# Patient Record
Sex: MALE | Race: BLACK OR AFRICAN AMERICAN | URBAN - METROPOLITAN AREA
[De-identification: names, ages, dates, MRNs, and addresses within clinical notes are randomized per-mention and may not be internally consistent; named-entity substitution may affect disease eponyms.]

---

## 2021-10-05 ENCOUNTER — OFFICE VISIT (OUTPATIENT)
Dept: URBAN - METROPOLITAN AREA CLINIC 29 | Facility: CLINIC | Age: 36
End: 2021-10-05
Payer: COMMERCIAL

## 2021-10-05 DIAGNOSIS — H53.022 REFRACTIVE AMBLYOPIA, LEFT EYE: Primary | ICD-10-CM

## 2021-10-05 PROCEDURE — 92004 COMPRE OPH EXAM NEW PT 1/>: CPT | Performed by: OPTOMETRIST

## 2021-10-05 ASSESSMENT — INTRAOCULAR PRESSURE
OS: 13
OD: 13

## 2021-10-05 ASSESSMENT — KERATOMETRY
OS: 43.50
OD: 43.00

## 2021-10-05 NOTE — IMPRESSION/PLAN
Impression: Refractive amblyopia, left eye: H53.022. Plan: Discussed diagnosis in detail with patient. No treatment is required at this time. Patient will update glasses RX first. Will continue to observe condition and or symptoms.